# Patient Record
Sex: MALE | Race: WHITE | Employment: STUDENT | URBAN - METROPOLITAN AREA
[De-identification: names, ages, dates, MRNs, and addresses within clinical notes are randomized per-mention and may not be internally consistent; named-entity substitution may affect disease eponyms.]

---

## 2019-05-01 PROBLEM — F41.8 PERFORMANCE ANXIETY: Status: ACTIVE | Noted: 2019-05-01

## 2019-05-01 PROBLEM — F41.9 ANXIETY: Status: ACTIVE | Noted: 2019-05-01

## 2019-05-01 NOTE — PATIENT INSTRUCTIONS
Anxiety:  - start propranolol 40 mg - take 1 tablet up to twice a day AS NEEDED for performance anxiety  - consider starting a daily medication such as Paxil (paroxetine), Lexapro (escitalopram), or Buspar (buspirone)    Follow up visit with Alfred Rueda in the

## 2019-05-01 NOTE — PROGRESS NOTES
Marvel Mederos is a 24year old male. HPI:   Patient presents to establish care and to discuss anxiety. Began feeling anxious about five years ago when he was in the [employer]. Was in a high stress position in the [employer].   Left the  about 18 with exertion  GI: denies diarrhea, constipation  : denies dysuria, hematuria  NEURO: denies headaches, dizziness  EXT: denies edema    EXAM:   /80 (BP Location: Left arm, Patient Position: Sitting, Cuff Size: large)   Pulse 80   Temp 98.2 °F (36.8

## 2020-05-18 ENCOUNTER — TELEMEDICINE (OUTPATIENT)
Dept: INTERNAL MEDICINE CLINIC | Facility: CLINIC | Age: 25
End: 2020-05-18
Payer: COMMERCIAL

## 2020-05-18 DIAGNOSIS — G47.00 INSOMNIA, UNSPECIFIED TYPE: ICD-10-CM

## 2020-05-18 DIAGNOSIS — R53.1 GENERAL WEAKNESS: ICD-10-CM

## 2020-05-18 DIAGNOSIS — R68.81 EARLY SATIETY: ICD-10-CM

## 2020-05-18 DIAGNOSIS — R63.4 WEIGHT LOSS: Primary | ICD-10-CM

## 2020-05-18 DIAGNOSIS — R53.83 FATIGUE, UNSPECIFIED TYPE: ICD-10-CM

## 2020-05-18 DIAGNOSIS — Z00.00 LABORATORY EXAM ORDERED AS PART OF ROUTINE GENERAL MEDICAL EXAMINATION: ICD-10-CM

## 2020-05-18 PROCEDURE — 99214 OFFICE O/P EST MOD 30 MIN: CPT | Performed by: PHYSICIAN ASSISTANT

## 2020-05-18 NOTE — PROGRESS NOTES
Virtual Video Check-In     This visit is conducted using Telemedicine with live, interactive video and audio.     Modesta Strong, who has verified his/her identification by name and , verbally consents to a Virtual/Telephone Check-In visit on  Anxiety       Past Surgical History:   Procedure Laterality Date   • OTHER SURGICAL HISTORY  age 14    testicular torsion surgery      Social History    Tobacco Use      Smoking status: Never Smoker      Smokeless tobacco: Never Used    Alcohol use: Not Cu been spent reviewing labs, medications, radiology tests and decision-making. Appropriate medical decision-making and tests are ordered as detailed in the plan of care above.   Coding/billing information is submitted for this visit based on complexity of ca

## 2020-05-18 NOTE — PATIENT INSTRUCTIONS
Please call Jose Alfredo Zurita at [phone] to set up the following tests:  - fasting blood work    Sleep:  Please do the followin. Please go to bed at the same time every night and wake-up at the same time every morning.   2. Please

## 2020-05-19 ENCOUNTER — LABORATORY ENCOUNTER (OUTPATIENT)
Dept: LAB | Age: 25
End: 2020-05-19
Attending: PHYSICIAN ASSISTANT
Payer: COMMERCIAL

## 2020-05-19 DIAGNOSIS — Z00.00 LABORATORY EXAM ORDERED AS PART OF ROUTINE GENERAL MEDICAL EXAMINATION: ICD-10-CM

## 2020-05-19 DIAGNOSIS — R53.83 FATIGUE, UNSPECIFIED TYPE: ICD-10-CM

## 2020-05-19 DIAGNOSIS — R53.1 GENERAL WEAKNESS: ICD-10-CM

## 2020-05-19 DIAGNOSIS — R68.81 EARLY SATIETY: ICD-10-CM

## 2020-05-19 DIAGNOSIS — R63.4 WEIGHT LOSS: ICD-10-CM

## 2020-05-19 PROCEDURE — 84439 ASSAY OF FREE THYROXINE: CPT | Performed by: PHYSICIAN ASSISTANT

## 2020-05-19 PROCEDURE — 83036 HEMOGLOBIN GLYCOSYLATED A1C: CPT | Performed by: PHYSICIAN ASSISTANT

## 2020-05-19 PROCEDURE — 84403 ASSAY OF TOTAL TESTOSTERONE: CPT | Performed by: PHYSICIAN ASSISTANT

## 2020-05-19 PROCEDURE — 82306 VITAMIN D 25 HYDROXY: CPT | Performed by: PHYSICIAN ASSISTANT

## 2020-05-19 PROCEDURE — 80061 LIPID PANEL: CPT | Performed by: PHYSICIAN ASSISTANT

## 2020-05-19 PROCEDURE — 36415 COLL VENOUS BLD VENIPUNCTURE: CPT | Performed by: PHYSICIAN ASSISTANT

## 2020-05-19 PROCEDURE — 80050 GENERAL HEALTH PANEL: CPT | Performed by: PHYSICIAN ASSISTANT

## 2020-05-21 RX ORDER — ERGOCALCIFEROL 1.25 MG/1
50000 CAPSULE ORAL WEEKLY
Qty: 12 CAPSULE | Refills: 0 | Status: SHIPPED | OUTPATIENT
Start: 2020-05-21 | End: 2020-08-07

## 2020-05-22 RX ORDER — ERGOCALCIFEROL 1.25 MG/1
50000 CAPSULE ORAL WEEKLY
Qty: 12 CAPSULE | Refills: 0 | OUTPATIENT
Start: 2020-05-22 | End: 2020-08-08

## 2020-06-22 RX ORDER — ERGOCALCIFEROL 1.25 MG/1
50000 CAPSULE ORAL WEEKLY
Qty: 12 CAPSULE | Refills: 0 | OUTPATIENT
Start: 2020-06-22 | End: 2020-09-08

## 2020-06-22 NOTE — TELEPHONE ENCOUNTER
LOV: 5/1/19 with Jaspal Shea PA-C  RTC: \"Follow up visit with Condon in the next month for anxiety and wellness visit\"  Last Relevant Labs: 5/19/2020  Filled: 5/21/2020 #12 with 0 refills  (please review lab results 5/19/2020)    No future appointmen